# Patient Record
Sex: MALE | Race: WHITE
[De-identification: names, ages, dates, MRNs, and addresses within clinical notes are randomized per-mention and may not be internally consistent; named-entity substitution may affect disease eponyms.]

---

## 2020-01-12 ENCOUNTER — HOSPITAL ENCOUNTER (EMERGENCY)
Dept: HOSPITAL 62 - ER | Age: 75
LOS: 1 days | Discharge: HOME | End: 2020-01-13
Payer: MEDICARE

## 2020-01-12 DIAGNOSIS — E10.9: ICD-10-CM

## 2020-01-12 DIAGNOSIS — N39.0: Primary | ICD-10-CM

## 2020-01-12 LAB
APPEARANCE UR: (no result)
APTT PPP: YELLOW S
BILIRUB UR QL STRIP: NEGATIVE
GLUCOSE UR STRIP-MCNC: NEGATIVE MG/DL
KETONES UR STRIP-MCNC: NEGATIVE MG/DL
NITRITE UR QL STRIP: NEGATIVE
PH UR STRIP: 8 [PH] (ref 5–9)
PROT UR STRIP-MCNC: 100 MG/DL
SP GR UR STRIP: 1.02
UROBILINOGEN UR-MCNC: 2 MG/DL (ref ?–2)

## 2020-01-12 PROCEDURE — 87591 N.GONORRHOEAE DNA AMP PROB: CPT

## 2020-01-12 PROCEDURE — 87491 CHLMYD TRACH DNA AMP PROBE: CPT

## 2020-01-12 PROCEDURE — 87186 SC STD MICRODIL/AGAR DIL: CPT

## 2020-01-12 PROCEDURE — 87088 URINE BACTERIA CULTURE: CPT

## 2020-01-12 PROCEDURE — 87086 URINE CULTURE/COLONY COUNT: CPT

## 2020-01-12 PROCEDURE — 36415 COLL VENOUS BLD VENIPUNCTURE: CPT

## 2020-01-12 PROCEDURE — 81001 URINALYSIS AUTO W/SCOPE: CPT

## 2020-01-12 PROCEDURE — 80053 COMPREHEN METABOLIC PANEL: CPT

## 2020-01-12 PROCEDURE — 85025 COMPLETE CBC W/AUTO DIFF WBC: CPT

## 2020-01-12 PROCEDURE — 99283 EMERGENCY DEPT VISIT LOW MDM: CPT

## 2020-01-13 VITALS — DIASTOLIC BLOOD PRESSURE: 65 MMHG | SYSTOLIC BLOOD PRESSURE: 130 MMHG

## 2020-01-13 LAB
ADD MANUAL DIFF: NO
ALBUMIN SERPL-MCNC: 4.3 G/DL (ref 3.5–5)
ALP SERPL-CCNC: 54 U/L (ref 38–126)
ANION GAP SERPL CALC-SCNC: 6 MMOL/L (ref 5–19)
APPEARANCE UR: (no result)
APTT PPP: YELLOW S
AST SERPL-CCNC: 31 U/L (ref 17–59)
BASOPHILS # BLD AUTO: 0 10^3/UL (ref 0–0.2)
BASOPHILS NFR BLD AUTO: 0.1 % (ref 0–2)
BILIRUB DIRECT SERPL-MCNC: 0.1 MG/DL (ref 0–0.4)
BILIRUB SERPL-MCNC: 1.2 MG/DL (ref 0.2–1.3)
BILIRUB UR QL STRIP: NEGATIVE
BUN SERPL-MCNC: 17 MG/DL (ref 7–20)
CALCIUM: 9.8 MG/DL (ref 8.4–10.2)
CHLAM PCR: NOT DETECTED
CHLORIDE SERPL-SCNC: 104 MMOL/L (ref 98–107)
CO2 SERPL-SCNC: 30 MMOL/L (ref 22–30)
EOSINOPHIL # BLD AUTO: 0 10^3/UL (ref 0–0.6)
EOSINOPHIL NFR BLD AUTO: 0 % (ref 0–6)
ERYTHROCYTE [DISTWIDTH] IN BLOOD BY AUTOMATED COUNT: 14.2 % (ref 11.5–14)
GLUCOSE SERPL-MCNC: 146 MG/DL (ref 75–110)
GLUCOSE UR STRIP-MCNC: NEGATIVE MG/DL
HCT VFR BLD CALC: 42 % (ref 37.9–51)
HGB BLD-MCNC: 14.3 G/DL (ref 13.5–17)
KETONES UR STRIP-MCNC: NEGATIVE MG/DL
LYMPHOCYTES # BLD AUTO: 0.8 10^3/UL (ref 0.5–4.7)
LYMPHOCYTES NFR BLD AUTO: 5.9 % (ref 13–45)
MCH RBC QN AUTO: 32.5 PG (ref 27–33.4)
MCHC RBC AUTO-ENTMCNC: 34 G/DL (ref 32–36)
MCV RBC AUTO: 96 FL (ref 80–97)
MONOCYTES # BLD AUTO: 1.1 10^3/UL (ref 0.1–1.4)
MONOCYTES NFR BLD AUTO: 8.9 % (ref 3–13)
NEUTROPHILS # BLD AUTO: 10.9 10^3/UL (ref 1.7–8.2)
NEUTS SEG NFR BLD AUTO: 85.1 % (ref 42–78)
NITRITE UR QL STRIP: NEGATIVE
PH UR STRIP: 7 [PH] (ref 5–9)
PLATELET # BLD: 131 10^3/UL (ref 150–450)
POTASSIUM SERPL-SCNC: 4.4 MMOL/L (ref 3.6–5)
PROT SERPL-MCNC: 7 G/DL (ref 6.3–8.2)
PROT UR STRIP-MCNC: 30 MG/DL
RBC # BLD AUTO: 4.39 10^6/UL (ref 4.35–5.55)
SP GR UR STRIP: 1.02
TOTAL CELLS COUNTED % (AUTO): 100 %
UROBILINOGEN UR-MCNC: 2 MG/DL (ref ?–2)
WBC # BLD AUTO: 12.8 10^3/UL (ref 4–10.5)

## 2020-01-13 NOTE — ER DOCUMENT REPORT
ED Medical Screen (RME)





- General


Chief Complaint: Pain With Urination


Stated Complaint: PAINFUL URINATION


Time Seen by Provider: 01/12/20 23:20


Notes: 





Patient is a 74-year-old male who presents to the emergency department with a 

chief complaint of burning and fullness when urinating.  He noticed he had 

symptoms today.  He also had some penile discharge.  He took a gram of 

azithromycin, the discharge went away.  Patient states that he is worried about 

prostatitis.  Patient states that he is sexually active with his wife.  He 

admits to some nausea.  Denies any abdominal pain. Admits to chills.





Exam: Deferred.  Patient will be evaluated by another provider when he is in her

room.





I have greeted and performed a rapid initial assessment of this patient.  A 

comprehensive ED assessment and evaluation of the patient, analysis of test res

ults and completion of medical decision making process will be conducted by an 

additional ED providers.





- Related Data


Allergies/Adverse Reactions: 


                                        





No Known Allergies Allergy (Verified 01/12/20 23:18)


   








Home Medications: ASA





Physical Exam





- Vital signs


Vitals: 


                                        











Temp Pulse Resp BP Pulse Ox


 


 98.9 F   61   20   139/75 H  98 


 


 01/12/20 22:41  01/12/20 22:41  01/12/20 22:41  01/12/20 22:41  01/12/20 22:41














Course





- Vital Signs


Vital signs: 


                                        











Temp Pulse Resp BP Pulse Ox


 


 98.9 F   61   20   139/75 H  98 


 


 01/12/20 22:41  01/12/20 22:41  01/12/20 22:41  01/12/20 22:41  01/12/20 22:41














- Laboratory


Laboratory results interpreted by me: 


                                        











  01/12/20





  23:23


 


Urine Protein  100 H


 


Urine Blood  SMALL H


 


Urine Urobilinogen  2.0 H


 


Ur Leukocyte Esterase  TRACE H

## 2020-01-13 NOTE — ER DOCUMENT REPORT
ED GI/





- General


Chief Complaint: Pain With Urination


Stated Complaint: PAINFUL URINATION


Time Seen by Provider: 01/12/20 23:20


Mode of Arrival: Ambulatory


Information source: Patient


Notes: 





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 

burning with urination.  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 

internist and easily had some burning and pain with urination and needed to get 

his urine checked.





- HPI


Patient complains to provider of: Other - Pain and burning with urination


Onset: Other - Several days


Timing/Duration: Gradual


Quality of pain: Burning


Severity at maximum: Moderate


Severity in ED: Moderate


Pain Level: 3


Location: Other - Pain with urination


Associated symptoms: Urinary frequency, Urinary urgency, Other - Burning with 

urination


Exacerbated by: Other - Urination


Relieved by: Denies


Similar symptoms previously: No


Recently seen / treated by doctor: No





- Related Data


Allergies/Adverse Reactions: 


                                        





No Known Allergies Allergy (Verified 01/12/20 23:18)


   








Home Medications: ASA





Past Medical History





- General


Information source: Patient





- Social History


Smoking Status: Never Smoker


Frequency of alcohol use: Social


Drug Abuse: None


Lives with: Family


Family History: Reviewed & Not Pertinent


Patient has suicidal ideation: No


Patient has homicidal ideation: No





- Past Medical History


Cardiac Medical History: Reports: Other - Patent foramen ovale


EENT Medical History: Reports: None


Neurological Medical History: Reports: Hx Cerebrovascular Accident


Endocrine Medical History: Reports: Hx Diabetes Mellitus Type 1


Renal/ Medical History: Reports: None


Malignancy Medical History: Reports None


GI Medical History: Reports: None


Musculoskeletal Medical History: Reports Hx Arthritis


Skin Medical History: Reports None


Psychiatric Medical History: Reports: None


Traumatic Medical History: Reports: None


Infectious Medical History: Reports: None


Past Surgical History: Reports: Hx Neurologic Surgery - Removal of emboli from 

brain, Hx Vascular Surgery - Repair of patent foramen ovale





- Immunizations


Immunizations up to date: Yes





Review of Systems





- Review of Systems


Constitutional: No symptoms reported


EENT: No symptoms reported


Cardiovascular: No symptoms reported


Respiratory: No symptoms reported


Gastrointestinal: No symptoms reported


Genitourinary: Burning, Frequency


Male Genitourinary: No symptoms reported


Musculoskeletal: No symptoms reported


Skin: No symptoms reported


Hematologic/Lymphatic: No symptoms reported


Neurological/Psychological: No symptoms reported


-: Yes All other systems reviewed and negative





Physical Exam





- Vital signs


Vitals: 


                                        











Temp Pulse Resp BP Pulse Ox


 


 98.9 F   61   20   139/75 H  98 


 


 01/12/20 22:41  01/12/20 22:41  01/12/20 22:41  01/12/20 22:41  01/12/20 22:41











Interpretation: Normal





- General


General appearance: Appears well, Alert





- HEENT


Head: Normocephalic, Atraumatic


Eyes: Normal


Pupils: PERRL





- Respiratory


Respiratory status: No respiratory distress


Chest status: Nontender


Breath sounds: Normal


Chest palpation: Normal





- Cardiovascular


Rhythm: Regular


Heart sounds: Normal auscultation


Murmur: No





- Abdominal


Inspection: Normal


Distension: No distension


Bowel sounds: Normal


Tenderness: Nontender


Organomegaly: No organomegaly





- Back


Back: Normal, Nontender





- Extremities


General upper extremity: Normal inspection, Nontender, Normal color, Normal ROM,

Normal temperature


General lower extremity: Normal inspection, Nontender, Normal color, Normal ROM,

Normal temperature, Normal weight bearing.  No: Nedra's sign





- Neurological


Neuro grossly intact: Yes


Cognition: Normal


Orientation: AAOx4


[place] Coma Scale Eye Opening: Spontaneous


[place] Coma Scale Verbal: Oriented


[place] Coma Scale Motor: Obeys Commands


[place] Coma Scale Total: 15


Speech: Normal


Motor strength normal: LUE, RUE, LLE, RLE


Sensory: Normal





- Psychological


Associated symptoms: Normal affect, Normal mood





- Skin


Skin Temperature: Warm


Skin Moisture: Dry


Skin Color: Normal





Course





- Re-evaluation


Re-evalutation: 





01/13/20 04:38


Patient was treated with Diflucan and given a prescription for 1 more Diflucan 

to take in 2 days.  He did have positive yeast in his urine.  Patient was 

instructed to please repeat his urine in a week to 10 days to ensure that the 

yeast is cleared.  Patient verbalized understanding and agreement with treatment

plan and patient was discharged home





- Vital Signs


Vital signs: 


                                        











Temp Pulse Resp BP Pulse Ox


 


 99.2 F   50 L  19   130/65 H  96 


 


 01/13/20 01:59  01/13/20 01:59  01/13/20 01:59  01/13/20 01:59  01/13/20 01:59














- Laboratory


Result Diagrams: 


                                 01/13/20 00:35





                                 01/13/20 00:35


Laboratory results interpreted by me: 


                                        











  01/12/20 01/13/20 01/13/20





  23:23 00:05 00:35


 


WBC    12.8 H


 


RDW    14.2 H


 


Plt Count    131 L


 


Lymph % (Auto)    5.9 L


 


Absolute Neuts (auto)    10.9 H


 


Seg Neutrophils %    85.1 H


 


Glucose   


 


Urine Protein  100 H  30 H 


 


Urine Blood  SMALL H  SMALL H 


 


Urine Urobilinogen  2.0 H  2.0 H 


 


Ur Leukocyte Esterase  TRACE H  SMALL H 














  01/13/20





  00:35


 


WBC 


 


RDW 


 


Plt Count 


 


Lymph % (Auto) 


 


Absolute Neuts (auto) 


 


Seg Neutrophils % 


 


Glucose  146 H


 


Urine Protein 


 


Urine Blood 


 


Urine Urobilinogen 


 


Ur Leukocyte Esterase 














Discharge





- Discharge


Clinical Impression: 


 Yeast UTI





Condition: Stable


Disposition: HOME, SELF-CARE


Additional Instructions: 


You were seen today for urinary yeast infection











YEAST INFECTION:





     You have evidence of a yeast infection -- called "candida."  A yeast 

infection often causes itching and discharge.  While not dangerous, it can be 

very unpleasant.  A yeast infection often follows the use of powerful 

antibiotics.  It is more likely to occur in diabetics.


     The treatment now is usually a single pill of Diflucan, but also an 

antifungal cream or suppository may be used for a few days.  You do not need to 

avoid sexual intercourse.


     Recurrences are common.  You can make a recurrence less likely by wearing 

cotton underwear and avoiding tight clothing.  For mild recurrences, you can try

over-the-counter creams or suppositories that are made specifically for yeast.


     If the symptoms do not resolve, you should follow up for re-examination.  

Sometimes treatment of the sexual partner is necessary if infections are 

recurrent.





FLUCONAZOLE:


     Fluconazole (Diflucan) is an antifungal drug.  It is useful for serious 

fungal infections, but is also excellent for oral or vaginal yeast infections.


     Diflucan interacts with some medicines.  This is a concern if you are taki

ng anticoagulants (such as Coumadin), phenytoin (Dilantin), cyclosporin, or oral

hypoglycemics (such as tolbutamide, Orinase, glipizide, Glucotrol, glyburide, 

DiaBeta, Glynase, and Micronase).  Be sure the doctor knows if you are taking 

one of these medicines.


     We don't know how Diflucan affects pregnancy.  If you are planning to 

become pregnant, discuss this with your doctor.


     Diflucan has few side effects.  Minor side effects may include nausea, 

headache, or diarrhea.  Call the doctor if you develop a skin rash, shortness of

breath, or other new symptoms.





FOLLOW-UP CARE:


If you have been referred to a physician for follow-up care, call the 

physicians office for an appointment as you were instructed or within the next 

two days.  If you experience worsening or a significant change in your symptoms,

notify the physician immediately or return to the Emergency Department at any ti

me for re-evaluation.


Prescriptions: 


Fluconazole [Diflucan 100 mg Tablet] 100 mg PO ONCE PRN #1 tablet


 PRN Reason: 


Forms:  Elevated Blood Pressure